# Patient Record
Sex: FEMALE | Race: WHITE | HISPANIC OR LATINO
[De-identification: names, ages, dates, MRNs, and addresses within clinical notes are randomized per-mention and may not be internally consistent; named-entity substitution may affect disease eponyms.]

---

## 2024-02-02 ENCOUNTER — APPOINTMENT (OUTPATIENT)
Dept: ORTHOPEDIC SURGERY | Facility: CLINIC | Age: 44
End: 2024-02-02
Payer: MEDICAID

## 2024-02-02 VITALS — HEIGHT: 66 IN | BODY MASS INDEX: 41.3 KG/M2 | WEIGHT: 257 LBS

## 2024-02-02 PROBLEM — Z00.00 ENCOUNTER FOR PREVENTIVE HEALTH EXAMINATION: Status: ACTIVE | Noted: 2024-02-02

## 2024-02-02 PROCEDURE — 99203 OFFICE O/P NEW LOW 30 MIN: CPT

## 2024-02-02 PROCEDURE — 73562 X-RAY EXAM OF KNEE 3: CPT | Mod: 50

## 2024-02-02 RX ORDER — LABETALOL HYDROCHLORIDE 200 MG/1
200 TABLET, FILM COATED ORAL
Refills: 0 | Status: ACTIVE | COMMUNITY

## 2024-02-02 RX ORDER — CYCLOBENZAPRINE HYDROCHLORIDE 10 MG/1
10 TABLET, FILM COATED ORAL
Refills: 0 | Status: ACTIVE | COMMUNITY

## 2024-02-02 NOTE — DATA REVIEWED
[FreeTextEntry1] : X-ray images were obtained at the office today.  AP, lateral, oblique views bilateral knees reveal no acute fractures, dislocations, bony abnormalities.  Spur growing off the superior pole of both right and left patella.  Mild joint space narrowing medially bilaterally

## 2024-02-02 NOTE — DISCUSSION/SUMMARY
[de-identified] : Patient has bilateral knee pain.  At this time explained patient may have patellofemoral syndrome bilaterally. Today, I am giving patient a prescription for physical therapy to work on strengthening, stretching, range of motion modalities.  Prescribed meloxicam 15 mg to take as needed for pain and inflammation.  Pt was educated about risks and benefits of anti-inflammatories.  Anti-inflammatories can irritate the stomach lining, so if there are any symptoms of acid reflux or heartburn the patient was instructed to stop taking the medication. To help prevent this, it is best to take with a meal. They cannot be taken if the pt is on blood thinners and if the patient is at risk of high blood pressure, blood pressure should be monitored daily while taking the medication.  Patient encouraged apply heat to the area and modify activity as tolerated.  Patient encouraged to wear compression sleeve or Ace bandages on the knees for support.  Patient will follow-up in approximately 4 to 6 weeks for further evaluation with the sports medicine department.

## 2024-02-02 NOTE — PHYSICAL EXAM
[de-identified] : Physical exam of B/L knees: -No erythema, edema, ecchymosis present bilaterally.  Skin intact -No TTP of bilateral knees -Calf is soft and nontender bilaterally -Negative Ginger's, negative anterior drawer, patient able to straight leg raise -Varus and valgus stability -Full ROM discomfort with full extension over the anterior aspect of the knee bilaterally -+2 posterior tibialis pulse -Sensation intact to light touch

## 2024-02-02 NOTE — HISTORY OF PRESENT ILLNESS
[de-identified] : 44-year-old female presents for bilateral knee pain.  Patient started having pain in her left knee over a month ago the pain started in the right knee approximately 1 week ago, no inciting event or injury for either knee.  Patient states the pain is intermittent.  Worse sitting to standing and going up and down the stairs or squatting.  Patient has relief with resting and taking Motrin as needed.  Patient denies any past injuries or surgeries to the knees.

## 2024-04-05 ENCOUNTER — APPOINTMENT (OUTPATIENT)
Dept: ORTHOPEDIC SURGERY | Facility: CLINIC | Age: 44
End: 2024-04-05
Payer: MEDICAID

## 2024-04-05 PROCEDURE — 99213 OFFICE O/P EST LOW 20 MIN: CPT

## 2024-04-05 RX ORDER — MELOXICAM 15 MG/1
15 TABLET ORAL
Qty: 30 | Refills: 1 | Status: ACTIVE | COMMUNITY
Start: 2024-02-02 | End: 1900-01-01

## 2024-04-05 NOTE — DISCUSSION/SUMMARY
[de-identified] : Bilateral knee pain follow-up  HPI Patient is a 44-year-old female reports to office for subsequent evaluation of her bilateral knee pain.  She has gone to formal physical therapy and has done provided directed at home exercises over the past several weeks which has given her little to no relief.  Walking, certain range of motion, up/down stairs, flexing and extending the knee all aggravate the patient's pain at times.  Denies any numbness or tingling.  Has taken meloxicam which has given her some relief.  Bilateral knee exam is as follows: Minimal effusion noted.  No erythema or ecchymosis.  Able to perform active straight leg raise.  Knee flexion from 0 to 110 degrees.  Medial joint line tenderness to palpation.  Calf is soft and nontender.  Positive Ginger's.  Light touch intact throughout.  Nonantalgic gait.  Assessment/plan Patient will continue taking meloxicam as needed for pain.  The knee conditioning program from the [facility] was given to the patient so they may try that at home.  The patient was advised to rest/ice the area and may alternate with warm compresses as needed.    Patient clinically may have a meniscal tear.  Bilateral knee MRI ordered for further evaluation.  Patient was advised to call the office a few days after getting the MRI done to discuss results over the phone.  The patient will follow-up in 8 weeks for further evaluation.  May consider injection at next visit if still symptomatic.  All of the patient's questions/concerns were answered in detail.

## 2024-04-19 ENCOUNTER — TRANSCRIPTION ENCOUNTER (OUTPATIENT)
Age: 44
End: 2024-04-19

## 2024-04-22 ENCOUNTER — APPOINTMENT (OUTPATIENT)
Dept: MRI IMAGING | Facility: CLINIC | Age: 44
End: 2024-04-22
Payer: MEDICAID

## 2024-04-22 PROCEDURE — 73721 MRI JNT OF LWR EXTRE W/O DYE: CPT | Mod: LT

## 2024-04-22 PROCEDURE — 73721 MRI JNT OF LWR EXTRE W/O DYE: CPT | Mod: RT

## 2024-06-14 ENCOUNTER — APPOINTMENT (OUTPATIENT)
Dept: ORTHOPEDIC SURGERY | Facility: CLINIC | Age: 44
End: 2024-06-14
Payer: MEDICAID

## 2024-06-14 DIAGNOSIS — M25.562 PAIN IN RIGHT KNEE: ICD-10-CM

## 2024-06-14 DIAGNOSIS — M25.561 PAIN IN RIGHT KNEE: ICD-10-CM

## 2024-06-14 PROCEDURE — 99213 OFFICE O/P EST LOW 20 MIN: CPT

## 2024-10-17 ENCOUNTER — APPOINTMENT (OUTPATIENT)
Dept: ORTHOPEDIC SURGERY | Facility: CLINIC | Age: 44
End: 2024-10-17